# Patient Record
Sex: MALE | Race: WHITE | Employment: STUDENT | URBAN - METROPOLITAN AREA
[De-identification: names, ages, dates, MRNs, and addresses within clinical notes are randomized per-mention and may not be internally consistent; named-entity substitution may affect disease eponyms.]

---
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
## 2023-01-18 NOTE — ED AVS SNAPSHOT
Received fax from pharmacy requesting refill on pts medication(s). Pt was last seen in office on 9/8/2022  and has a follow up scheduled for Visit date not found. Will send request to  Dr. Andreea Negron  for authorization.      Requested Prescriptions     Pending Prescriptions Disp Refills    gabapentin (NEURONTIN) 300 MG capsule [Pharmacy Med Name: gabapentin 300 mg capsule] 90 capsule 5     Sig: TAKE ONE CAPSULE BY MOUTH THREE TIMES DAILY Rojas Garcia   MRN: [id number]    Department:  [facility] Emergency Department   Date of Visit:  1/17/2018           Disclosure     Insurance plans vary and the physician(s) referred by the ER may not be covered by your plan.  Please contact CARE PHYSICIAN AT ONCE OR RETURN IMMEDIATELY TO THE EMERGENCY DEPARTMENT. If you have been prescribed any medication(s), please fill your prescription right away and begin taking the medication(s) as directed.   If you believe that any of the medications

## (undated) NOTE — LETTER
[facility] [place] [address] [facility] [address]  Dept: [phone]  Dept Fax: [phone]  Loc: [phone]      November 15, 2017    Patient: Donnie Winchester   Date of Visit: 11/15/2017       To Whom It May Concern:     Eugene Richardson

## (undated) NOTE — ED AVS SNAPSHOT
Linnette Lundborg   MRN: [id number]    Department:  [facility] Emergency Department   Date of Visit:  2/13/2018           Disclosure     Insurance plans vary and the physician(s) referred by the ER may not be covered by your plan.  Please contact CARE PHYSICIAN AT ONCE OR RETURN IMMEDIATELY TO THE EMERGENCY DEPARTMENT. If you have been prescribed any medication(s), please fill your prescription right away and begin taking the medication(s) as directed.   If you believe that any of the medications

## (undated) NOTE — LETTER
[facility] [place]  [address] [place] [address]  Dept: [phone]  Dept Fax: [phone]  Loc: [phone]      November 28, 2017    Patient: Jane Muhammad   Date of Visit: 11/28/2017       To Whom It May Concern:     Amada Resendiz

## (undated) NOTE — LETTER
January 17, 2018    Patient: Davin Joshi   Date of Visit: 1/17/2018       To Whom It May Concern:    Jose Antonio Collins was seen and treated in our emergency department on 1/17/2018. He should not return to school until 1/18/2018.     If you have any que